# Patient Record
(demographics unavailable — no encounter records)

---

## 2017-04-04 NOTE — PHYS DOC
Past Medical History


Past Medical History:  Other


Additional Past Medical Histor:  adhd


Past Surgical History:  No Surgical History


Alcohol Use:  None


Drug Use:  None





Adult General


Chief Complaint


Chief Complaint:  EARACHE/EAR PAIN





HPI


HPI


Patient is a 15  year old male who presents with his mother for insect in left 

ear.  Patient states he felt something moving inside of his ear about one hour 

prior to arrival. Otherwise denies any complaints. They were unable to remove 

it at home.





Review of Systems


Review of Systems


Constitutional: Denies fever or chills 


HENT: Reports ear foreign body


Respiratory: Denies cough 


GI: Denies abdominal pain


Integument: Denies rash or skin lesions 


Neurologic: Denies headache





Current Medications


Current Medications





 Current Medications








 Medications


  (Trade)  Dose


 Ordered  Sig/Meghan  Start Time


 Stop Time Status Last Admin


Dose Admin


 


 Lidocaine HCl


  (Xylocaine-Mpf


 1% Vial)  2 ml  1X  ONCE  4/4/17 05:00


 4/4/17 05:01 DC 4/4/17 04:27


2 ML











Allergies


Allergies





 Allergies








Coded Allergies Type Severity Reaction Last Updated Verified


 


  No Known Drug Allergies    3/24/16 No











Physical Exam


Physical Exam


Constitutional: Well developed, well nourished, no acute distress, non-toxic 

appearance. 


HENT: Normocephalic, atraumatic, bilateral external ears normal, insect 

visualized in the left EAC, oropharynx moist, nose normal.


Eyes: conjunctiva normal, no discharge.


Cardiovascular:  no edema. 


Lungs & Thorax: no respiratory distress.


Abdomen:  nondistended.


Skin: Warm, dry,


Extremities: No deformity


Neurologic: Alert and oriented X 3





Current Patient Data


Vital Signs





 Vital Signs








  Date Time  Temp Pulse Resp B/P Pulse Ox O2 Delivery O2 Flow Rate FiO2


 


4/4/17 04:12 97.6  16  98   





 97.6       











EKG


EKG


[]





Radiology/Procedures


Radiology/Procedures


[]





Course & Med Decision Making


Course & Med Decision Making


Pertinent Labs and Imaging studies reviewed. (See chart for details)


Patient presents with insect in left ear. RN irrigated with 1% lidocaine.  

Insect extracted by me using alligator forceps.  Patient experienced relief of 

discomfort.  Follow up as needed with primary care doctor.  Discharged home in 

stable & improved condition. 


[]





Dragon Disclaimer


Dragon Disclaimer


This electronic medical record was generated, in whole or in part, using a 

voice recognition dictation system.





Departure


Departure


Impression:  


 Primary Impression:  


 Ear foreign body


Disposition:  01 HOME, SELF-CARE


Condition:  IMPROVED


Referrals:  


UNKNOWN PCP NAME (PCP)


Patient Instructions:  Ear Foreign Body, Easy-to-Read





Additional Instructions:


[place] was seen in the emergency department today for insect in his ear.  It was 

successfully removed. Give Tylenol or ibuprofen for pain. Follow-up as needed 

with primary care physician.








ANJUM GRANDA MD Apr 4, 2017 04:48

## 2017-07-19 NOTE — PHYS DOC
Past Medical History


Past Medical History:  Other


Additional Past Medical Histor:  adhd


Past Surgical History:  No Surgical History


Alcohol Use:  None


Drug Use:  None





Adult General


Chief Complaint


Chief Complaint:  OTHER COMPLAINTS





[facility]


HPI





Patient is a 15  year old male presents to the emergency department with 

complaints of swelling and redness to the right ring finger. Patient states he 

noted it last night when going to bed. Mother states that she noticed this 

morning the child had red streaks going up the arm. Child reportedly chews on 

his cuticles infrequently gets a paronychia infection





Review of Systems


Review of Systems





Constitutional: Denies fever or chills []


Eyes: Denies change in visual acuity, redness, or eye pain []


HENT: Denies nasal congestion or sore throat []


Respiratory: Denies cough or shortness of breath []


Cardiovascular: No additional information not addressed in HPI []


GI: Denies abdominal pain, nausea, vomiting, bloody stools or diarrhea []


: Denies dysuria or hematuria []


Musculoskeletal: Denies back pain or joint pain []


Integument: Redness and swelling to the right ring finger


Neurologic: Denies headache, focal weakness or sensory changes []


Endocrine: Denies polyuria or polydipsia []





Allergies


Allergies





Allergies








Coded Allergies Type Severity Reaction Last Updated Verified


 


  No Known Drug Allergies    3/24/16 No











Physical Exam


Physical Exam





Constitutional: Well developed, well nourished, no acute distress, non-toxic 

appearance. []


Neck: Normal range of motion, no tenderness, supple, no lymphadenopathy no 

stridor. [] 


Cardiovascular:Heart rate regular rhythm, no murmur []


Lungs & Thorax:  Bilateral breath sounds clear to auscultation []


Skin: Warm, dry, no erythema, no rash. [] 


Extremities: Right ring finger with draining paronychia and moderate swelling, 

medial aspect of the cuticle. There is noted lymphangitis to the midforearm. 

Neurovascular intact distally.





EKG


EKG


[]





Radiology/Procedures


Radiology/Procedures


[]





Course & Med Decision Making


Course & Med Decision Making


Pertinent Labs and Imaging studies reviewed. (See chart for details)





[]





Dragon Disclaimer


Dragon Disclaimer


This electronic medical record was generated, in whole or in part, using a 

voice recognition dictation system.





Departure


Departure


Impression:  


 Primary Impression:  


 Paronychia


 Additional Impression:  


 Lymphangitis


Disposition:  01 HOME, SELF-CARE


Condition:  STABLE


Referrals:  


UNKNOWN PCP NAME (PCP)








[facility]


Patient Instructions:  Paronychia





Additional Instructions:  


Epsom salt soaks 4 times a day


Scripts


Amoxicillin/Potassium Clav (AUGMENTIN 875-125 TABLET) 1 Each Tablet


1 TAB PO BID, #20 TAB


   Prov: SAMMI TINOCO         7/19/17





Problem Qualifiers











SAMMI TINOCO Jul 19, 2017 18:47